# Patient Record
Sex: MALE | Race: WHITE
[De-identification: names, ages, dates, MRNs, and addresses within clinical notes are randomized per-mention and may not be internally consistent; named-entity substitution may affect disease eponyms.]

---

## 2021-11-16 ENCOUNTER — HOSPITAL ENCOUNTER (EMERGENCY)
Dept: HOSPITAL 35 - ER | Age: 46
Discharge: HOME | End: 2021-11-16
Payer: COMMERCIAL

## 2021-11-16 VITALS — DIASTOLIC BLOOD PRESSURE: 63 MMHG | SYSTOLIC BLOOD PRESSURE: 125 MMHG

## 2021-11-16 VITALS — BODY MASS INDEX: 37.22 KG/M2 | WEIGHT: 260.01 LBS | HEIGHT: 70 IN

## 2021-11-16 DIAGNOSIS — J12.82: ICD-10-CM

## 2021-11-16 DIAGNOSIS — U07.1: Primary | ICD-10-CM

## 2021-11-16 DIAGNOSIS — Z91.018: ICD-10-CM

## 2021-11-17 NOTE — EKG
[address]  [address]
Phone:  [phone]                    ELECTROCARDIOGRAM REPORT      
_______________________________________________________________________________
 
Name:       ABEBA BURNS             Room #:                     [facility]#:      [id number]     Account #:      [id number]  
Admission:  21    Attend Phys:                          
Discharge:  21    Date of Birth:  [birth year]  
                                                          Report #: [id number]
   [id number]
_______________________________________________________________________________
                         [facility] ED
                                       
Test Date:    2021               Test Time:    15:12:20
Pat Name:     ABEBA BURNS          Department:   
Patient ID:   [id number]            Room:          
Gender:                               Technician:   [facility]
:          1975               Requested By: Pia Hendrickson
Order Number: [id number] MD:   Regis Gupta
                                 Measurements
Intervals                              Axis          
Rate:         89                       P:            52
MT:           145                      QRS:          27
QRSD:         103                      T:            71
QT:           344                                    
QTc:          419                                    
                           Interpretive Statements
Sinus rhythm
Atrial premature complex
Probable left atrial enlargement
No previous ECG available for comparison
Electronically Signed On 2021 7:19:31 CST by Regis Gupta
[email or web address]
 
 
 
 
 
 
 
 
 
 
 
 
 
 
 
 
 
 
 
 
 
  <ELECTRONICALLY SIGNED>
   By: Regis Gupta MD, [facility]    
  21     0719
D: 21 1512                           _____________________________________
T: 21 1512                           Regis Gupta MD, FACC      /EPI